# Patient Record
Sex: FEMALE | Race: WHITE
[De-identification: names, ages, dates, MRNs, and addresses within clinical notes are randomized per-mention and may not be internally consistent; named-entity substitution may affect disease eponyms.]

---

## 2019-09-26 ENCOUNTER — HOSPITAL ENCOUNTER (EMERGENCY)
Dept: HOSPITAL 97 - ER | Age: 44
Discharge: ANOTHER HOSPITAL | End: 2019-09-26
Payer: COMMERCIAL

## 2019-09-26 VITALS — TEMPERATURE: 97.8 F

## 2019-09-26 VITALS — DIASTOLIC BLOOD PRESSURE: 71 MMHG | SYSTOLIC BLOOD PRESSURE: 101 MMHG | OXYGEN SATURATION: 98 %

## 2019-09-26 DIAGNOSIS — Z72.0: ICD-10-CM

## 2019-09-26 DIAGNOSIS — I10: ICD-10-CM

## 2019-09-26 DIAGNOSIS — Z71.6: ICD-10-CM

## 2019-09-26 DIAGNOSIS — E78.5: ICD-10-CM

## 2019-09-26 DIAGNOSIS — I20.8: ICD-10-CM

## 2019-09-26 DIAGNOSIS — I21.4: Primary | ICD-10-CM

## 2019-09-26 DIAGNOSIS — F41.8: ICD-10-CM

## 2019-09-26 LAB
ALBUMIN SERPL BCP-MCNC: 4.3 G/DL (ref 3.4–5)
ALP SERPL-CCNC: 74 U/L (ref 45–117)
ALT SERPL W P-5'-P-CCNC: 45 U/L (ref 12–78)
AST SERPL W P-5'-P-CCNC: 28 U/L (ref 15–37)
BUN BLD-MCNC: 8 MG/DL (ref 7–18)
GLUCOSE SERPLBLD-MCNC: 85 MG/DL (ref 74–106)
HCT VFR BLD CALC: 42.3 % (ref 36–45)
INR BLD: 0.98
LIPASE SERPL-CCNC: 166 U/L (ref 73–393)
LYMPHOCYTES # SPEC AUTO: 4.5 K/UL (ref 0.7–4.9)
MAGNESIUM SERPL-MCNC: 1.9 MG/DL (ref 1.8–2.4)
METHAMPHET UR QL SCN: NEGATIVE
NT-PROBNP SERPL-MCNC: 318 PG/ML (ref ?–125)
PMV BLD: 8.3 FL (ref 7.6–11.3)
POTASSIUM SERPL-SCNC: 3.5 MMOL/L (ref 3.5–5.1)
RBC # BLD: 4.84 M/UL (ref 3.86–4.86)
THC SERPL-MCNC: NEGATIVE NG/ML
TROPONIN (EMERG DEPT USE ONLY): 0.23 NG/ML (ref 0–0.04)

## 2019-09-26 PROCEDURE — 83735 ASSAY OF MAGNESIUM: CPT

## 2019-09-26 PROCEDURE — 96374 THER/PROPH/DIAG INJ IV PUSH: CPT

## 2019-09-26 PROCEDURE — 80320 DRUG SCREEN QUANTALCOHOLS: CPT

## 2019-09-26 PROCEDURE — 80076 HEPATIC FUNCTION PANEL: CPT

## 2019-09-26 PROCEDURE — 84484 ASSAY OF TROPONIN QUANT: CPT

## 2019-09-26 PROCEDURE — 85610 PROTHROMBIN TIME: CPT

## 2019-09-26 PROCEDURE — 71045 X-RAY EXAM CHEST 1 VIEW: CPT

## 2019-09-26 PROCEDURE — 36415 COLL VENOUS BLD VENIPUNCTURE: CPT

## 2019-09-26 PROCEDURE — 85025 COMPLETE CBC W/AUTO DIFF WBC: CPT

## 2019-09-26 PROCEDURE — 96372 THER/PROPH/DIAG INJ SC/IM: CPT

## 2019-09-26 PROCEDURE — 80307 DRUG TEST PRSMV CHEM ANLYZR: CPT

## 2019-09-26 PROCEDURE — 81003 URINALYSIS AUTO W/O SCOPE: CPT

## 2019-09-26 PROCEDURE — 93005 ELECTROCARDIOGRAM TRACING: CPT

## 2019-09-26 PROCEDURE — 80048 BASIC METABOLIC PNL TOTAL CA: CPT

## 2019-09-26 PROCEDURE — 96375 TX/PRO/DX INJ NEW DRUG ADDON: CPT

## 2019-09-26 PROCEDURE — 83880 ASSAY OF NATRIURETIC PEPTIDE: CPT

## 2019-09-26 PROCEDURE — 99285 EMERGENCY DEPT VISIT HI MDM: CPT

## 2019-09-26 PROCEDURE — 83690 ASSAY OF LIPASE: CPT

## 2019-09-26 NOTE — EDPHYS
Physician Documentation                                                                           

 [facility]                                                                 

Name: Yolande Soliz                                                                               

Age: 43 yrs                                                                                       

Sex: Female                                                                                       

: 1975                                                                                   

MRN: [id number]                                                                                   

Arrival Date: 2019                                                                          

Time: 02:23                                                                                       

Account#: [id number]                                                                            

Bed 8                                                                                             

Private MD:                                                                                       

ED Physician Dario Lambert                                                                      

HPI:                                                                                              

                                                                                             

03:28 This 43 yrs old  Female presents to ER via Wheelchair with complaints of chest audra 

      pain.                                                                                       

03:28 The patient or guardian reports chest pain that is located primarily in the substernal  audra 

      area. Onset: just prior to arrival, this morning. The pain does not radiate. Associated     

      signs and symptoms: Pertinent positives: lightheadedness, nausea, shortness of breath.      

      The chest pain is described as clutching, crushing, a heaviness, a pressure. Modifying      

      factors: The symptoms are alleviated by nothing. the symptoms are aggravated by             

      nothing. Severity of pain: At its worst the pain was moderate in the emergency              

      department the pain has improved mildly.                                                    

                                                                                                  

OB/GYN:                                                                                           

04:41 LMP N/A - Hysterectomy                                                                  tl1 

                                                                                                  

Historical:                                                                                       

- Allergies:                                                                                      

02:32 No Known Allergies;                                                                     ak1 

- Home Meds:                                                                                      

02:32 buspirone 10 mg Oral tab 1 tab 2 times per day for Generalized Anxiety Disorder         ak1 

      [Active]; atorvastatin 10 mg oral tab 1 tab once daily for Hyperlipidemia [Active];         

      hydroxyzine HCl 25 mg Oral tab 1 tab 2 times daily for Anxiety [Active]; montelukast 10     

      mg oral tab 1 tab once daily [Active]; metoprolol tartrate 100 mg Oral tab 1 tab 2          

      times per day for Hypertension [Active]; losartan 100 mg oral tab 1 tab once daily          

      [Active];                                                                                   

- PMHx:                                                                                           

02:32 Hyperlipidemia; Hypertension; Anxiety;                                                  ak1 

- PSHx:                                                                                           

02:37 Tubal ligation; Hysterectomy;                                                           ak1 

                                                                                                  

- Immunization history:: Adult Immunizations unknown.                                             

- Social history:: Smoking status: Patient uses tobacco products, smokes one pack                 

  cigarettes per day.                                                                             

- Ebola Screening: : No symptoms or risks identified at this time.                                

- Family history:: not pertinent.                                                                 

                                                                                                  

                                                                                                  

ROS:                                                                                              

03:28 Constitutional: Negative for fever, chills, and weight loss, Eyes: Negative for injury, audra 

      pain, redness, and discharge, ENT: Negative for injury, pain, and discharge, Neck:          

      Negative for injury, pain, and swelling, Respiratory: Negative for shortness of breath,     

      cough, wheezing, and pleuritic chest pain, Abdomen/GI: Negative for abdominal pain,         

      nausea, vomiting, diarrhea, and constipation, Back: Negative for injury and pain, :       

      Negative for injury, bleeding, discharge, and swelling, MS/Extremity: Negative for          

      injury and deformity, Skin: Negative for injury, rash, and discoloration, Neuro:            

      Negative for headache, weakness, numbness, tingling, and seizure, Psych: Negative for       

      depression, anxiety, suicide ideation, homicidal ideation, and hallucinations,              

      Allergy/Immunology: Negative for hives, rash, and allergies, Endocrine: Negative for        

      neck swelling, polydipsia, polyuria, polyphagia, and marked weight changes,                 

      Hematologic/Lymphatic: Negative for swollen nodes, abnormal bleeding, and unusual           

      bruising.                                                                                   

03:28 Cardiovascular: Positive for chest pain, of the chest.                                      

                                                                                                  

Exam:                                                                                             

03:28 Constitutional:  This is a well developed, well nourished patient who is awake, alert,  audra 

      and in no acute distress. Head/Face:  Normocephalic, atraumatic. Eyes:  Pupils equal        

      round and reactive to light, extra-ocular motions intact.  Lids and lashes normal.          

      Conjunctiva and sclera are non-icteric and not injected.  Cornea within normal limits.      

      Periorbital areas with no swelling, redness, or edema. ENT:  Nares patent. No nasal         

      discharge, no septal abnormalities noted.  Tympanic membranes are normal and external       

      auditory canals are clear.  Oropharynx with no redness, swelling, or masses, exudates,      

      or evidence of obstruction, uvula midline.  Mucous membranes moist. Neck:  Trachea          

      midline, no thyromegaly or masses palpated, and no cervical lymphadenopathy.  Supple,       

      full range of motion without nuchal rigidity, or vertebral point tenderness.  No            

      Meningismus. Chest/axilla:  Normal chest wall appearance and motion.  Nontender with no     

      deformity.  No lesions are appreciated. Respiratory:  Lungs have equal breath sounds        

      bilaterally, clear to auscultation and percussion.  No rales, rhonchi or wheezes noted.     

       No increased work of breathing, no retractions or nasal flaring. Abdomen/GI:  Soft,        

      non-tender, with normal bowel sounds.  No distension or tympany.  No guarding or            

      rebound.  No evidence of tenderness throughout. Back:  No spinal tenderness.  No            

      costovertebral tenderness.  Full range of motion. Female :  Normal external               

      genitalia. Skin:  Warm, dry with normal turgor.  Normal color with no rashes, no            

      lesions, and no evidence of cellulitis. MS/ Extremity:  Pulses equal, no cyanosis.          

      Neurovascular intact.  Full, normal range of motion. Neuro:  Awake and alert, GCS 15,       

      oriented to person, place, time, and situation.  Cranial nerves II-XII grossly intact.      

      Motor strength 5/5 in all extremities.  Sensory grossly intact.  Cerebellar exam            

      normal.  Normal gait. Psych:  Awake, alert, with orientation to person, place and time.     

       Behavior, mood, and affect are within normal limits.                                       

03:28 Cardiovascular: Rate: normal, Rhythm: regular, Pulses: Pulses are 4+ in bilateral           

      radial, brachial, femoral, popliteal, posterior tibial and and dorsalis pedis               

      arteries.. Heart sounds: normal, Edema: is not appreciated, JVD: is not appreciated.        

                                                                                                  

Vital Signs:                                                                                      

02:27  / 97; Pulse 94; Resp 15; Temp 97.8(TE); Pulse Ox 100% on R/A; Weight 72.57 kg    ak1 

      (R); Height 5 ft. 7 in. (170.18 cm) (R); Pain 8/10;                                         

03:07  / 77; Pulse 89; Resp 16; Pulse Ox 95% on R/A;                                    tl1 

04:00  / 77; Pulse 82; Resp 16 S; Pulse Ox 100% on 2 lpm NC;                            bb  

04:41  / 88; Pulse 76; Resp 22; Pulse Ox 100% on R/A; Pain 0/10;                        tl1 

05:59  / 71; Pulse 68; Resp 23; Pulse Ox 98% on R/A;                                    tl1 

02:27 Body Mass Index 25.06 (72.57 kg, 170.18 cm)                                             ak1 

                                                                                                  

MDM:                                                                                              

02:41 Patient medically screened.                                                             [facility] 

06:12 Data reviewed: vital signs, nurses notes, lab test result(s), EKG, radiologic studies,  audra 

      plain films.                                                                                

                                                                                                  

                                                                                             

02:32 Order name: Basic Metabolic Panel; Complete Time: 06:11                                   

                                                                                             

02:32 Order name: CBC with Diff; Complete Time: 03:16                                           

                                                                                             

02:32 Order name: LFT's; Complete Time: 06:11                                                   

                                                                                             

02:32 Order name: Magnesium; Complete Time: 06:11                                               

                                                                                             

02:32 Order name: NT PRO-BNP; Complete Time: 06:11                                              

                                                                                             

02:32 Order name: PT-INR; Complete Time: 03:16                                                  

                                                                                             

02:32 Order name: Troponin (emerg Dept Use Only); Complete Time: 06:11                          

                                                                                             

02:32 Order name: XRAY Chest (1 view)                                                           

                                                                                             

03:27 Order name: UDS; Complete Time: 06:11                                                   [facility] 

                                                                                             

03:27 Order name: ETOH Level; Complete Time: 06:11                                            [facility] 

                                                                                             

03:36 Order name: Lipase; Complete Time: 06:11                                                [facility]

                                                                                             

03:48 Order name: Urine Dipstick--Ancillary (enter results)                                   [facility] 

                                                                                             

02:32 Order name: EKG; Complete Time: 02:35                                                     

                                                                                             

02:32 Order name: Cardiac monitoring; Complete Time: 02:44                                      

                                                                                             

02:32 Order name: EKG - Nurse/Tech; Complete Time: 02:44                                        

                                                                                             

02:32 Order name: IV Saline Lock; Complete Time: 02:44                                          

                                                                                             

02:32 Order name: Labs collected and sent; Complete Time: 02:44                                 

                                                                                             

02:32 Order name: O2 Per Protocol; Complete Time: 02:44                                         

                                                                                             

03:17 Order name: EKG; Complete Time: 03:19                                                   [facility] 

                                                                                             

06:12 Order name: EKG; Complete Time: 06:13                                                   [facility] 

                                                                                             

02:32 Order name: O2 Sat Monitoring; Complete Time: 04:43                                       

                                                                                             

03:17 Order name: EKG - Nurse/Tech; Complete Time: 03:24                                      [facility] 

                                                                                             

06:12 Order name: EKG - Nurse/Tech; Complete Time: 06:12                                      [facility] 

                                                                                                  

Administered Medications:                                                                         

02:47 Drug: Aspirin Chewable Tablet 162 mg Route: PO;                                         tl1 

04:35 Follow up: Response: No adverse reaction                                                bb  

03:32 Drug: morphine 2 mg Route: IVP; Infused Over: 2 mins; Site: right antecubital;          tl1 

04:35 Follow up: Response: No adverse reaction; RASS: Alert and Calm (0)                        

03:32 Drug: Zofran 4 mg Route: IVP; Infused Over: 2 mins; Site: right antecubital;            tl1 

04:35 Follow up: Response: No adverse reaction                                                bb  

03:33 Drug: Lopressor (metoprolol TARTRATE) 50 mg Route: PO;                                  tl1 

04:36 Follow up: Response: No adverse reaction                                                bb  

03:33 Drug: PlaVIX 600 mg Route: PO;                                                          tl1 

04:34 Follow up: Response: No adverse reaction                                                bb  

03:33 Drug: Pepcid 20 mg Route: IVP; Infused Over: 2 mins; Site: right antecubital;           tl1 

04:34 Follow up: Response: No adverse reaction                                                bb  

03:34 Drug: Lovenox 80 mg Route: Sub-Q; Site: abdomen;                                        tl1 

04:36 Follow up: Response: No adverse reaction                                                bb  

05:58 Drug: morphine 2 mg Route: IVP; Infused Over: 2 mins; Site: right antecubital;          tl1 

06:24 Follow up: Response: No adverse reaction; Marked relief of symptoms; Pain is decreased; tl1 

      RASS: Drowsy (-1)                                                                           

05:59 Drug: Zofran 4 mg Route: IVP; Infused Over: 2 mins; Site: right antecubital;            tl1 

06:20 Follow up: Response: No adverse reaction; Marked relief of symptoms; Nausea is decreasedtl1 

06:13 Drug: fentaNYL (PF) 50 mcg Route: IVP; Infused Over: 2 mins; Site: right antecubital;   tl1 

06:20 Follow up: Response: No adverse reaction; Marked relief of symptoms; Pain is decreased; tl1 

      RASS: Drowsy (-1)                                                                           

                                                                                                  

                                                                                                  

Disposition:                                                                                      

19 03:34 Transfer ordered to [facility]. Diagnosis are Chest pain,      

  unspecified, Angina pectoris, Non-ST elevation (NSTEMI) myocardial                              

  infarction, Tobacco abuse counseling, Tobacco use, Alcohol abuse.                               

- Reason for transfer: Higher level of care.                                                      

- Accepting physician is to sht, cardiology.                                                      

- Condition is Fair.                                                                              

- Problem is new.                                                                                 

- Symptoms have improved.                                                                         

                                                                                                  

                                                                                                  

                                                                                                  

Signatures:                                                                                       

Dispatcher [employer]                           EDMS                                                 

Dario Lambert MD MD cha Ballard, Brenda, RN                     RN   bb                                                   

Ofelia Cook, RN                      RN   tl1                                                  

Cady Torres RN                       RN   ak1                                                  

                                                                                                  

Corrections: (The following items were deleted from the chart)                                    

03:36 03:28 LIPASE+C.LAB.BRZ ordered. EDMS                                                    EDMS

06:13 03:34 2019 03:34 Transfer ordered to [facility]. Diagnosis is audra 

      Chest pain, unspecified; Angina pectoris; Non-ST elevation (NSTEMI) myocardial              

      infarction; Tobacco abuse counseling; Tobacco use. Reason for transfer: Higher level of     

      care. Accepting physician is to t, cardiology. Condition is Fair. Problem is new.         

      Symptoms have improved. [facility]                                                                 

06:42 06:13 2019 03:34 Transfer ordered to [facility]. Diagnosis is tl1 

      Chest pain, unspecified; Angina pectoris; Non-ST elevation (NSTEMI) myocardial              

      infarction; Tobacco abuse counseling; Tobacco use; Alcohol abuse. Reason for transfer:      

      Higher level of care. Accepting physician is to [facility], cardiology. Condition is Fair.         

      Problem is new. Symptoms have improved. audra                                                 

                                                                                                  

**************************************************************************************************

## 2019-09-26 NOTE — RAD REPORT
EXAM DESCRIPTION:  RAD - Chest Single View - 9/26/2019 3:07 am

 

CLINICAL HISTORY:  Chest pain

 

COMPARISON:  None.

 

TECHNIQUE:  AP portable chest image was obtained 0305 hours .

 

FINDINGS:  Lungs are clear. Heart and vasculature are normal. No measurable pleural effusion and no p
neumothorax. No acute bony abnormality seen. No acute aortic findings suspected.

 

IMPRESSION:  No acute cardiopulmonary process.

## 2019-09-26 NOTE — ER
Nurse's Notes                                                                                     

 [facility]                                                                 

Name: Yolande Soliz                                                                               

Age: 43 yrs                                                                                       

Sex: Female                                                                                       

: 1975                                                                                   

MRN: [id number]                                                                                   

Arrival Date: 2019                                                                          

Time: 02:23                                                                                       

Account#: [id number]                                                                            

Bed 8                                                                                             

Private MD:                                                                                       

Diagnosis: Chest pain, unspecified;Angina pectoris;Non-ST elevation (NSTEMI) myocardial           

  infarction;Tobacco abuse counseling;Tobacco use;Alcohol abuse                                   

                                                                                                  

Presentation:                                                                                     

                                                                                             

02:28 Presenting complaint: Patient states: chest pain to center of her chest. pt with        ak1 

      cardiology appointment 10/7/19 in [place]. pt stated she has had intermittent chest     

      pain X3 months PTA. pt stated she drank earlier tonight. Transition of care: patient        

      was not received from another setting of care. Onset of symptoms is unknown. Risk           

      Assessment: Do you want to hurt yourself or someone else? Patient reports no desire to      

      harm self or others. Initial Sepsis Screen: Does the patient meet any 2 criteria? No.       

      Patient's initial sepsis screen is negative. Does the patient have a suspected source       

      of infection? No. Patient's initial sepsis screen is negative. Care prior to arrival:       

      None.                                                                                       

02:28 Method Of Arrival: Wheelchair                                                           ak1 

02:28 Acuity: SHEEBA 3                                                                           ak1 

                                                                                                  

Triage Assessment:                                                                                

02:32 General: Appears in no apparent distress. uncomfortable, Behavior is cooperative,       ak1 

      anxious.                                                                                    

                                                                                                  

OB/GYN:                                                                                           

04:41 LMP N/A - Hysterectomy                                                                  tl1 

                                                                                                  

Historical:                                                                                       

- Allergies:                                                                                      

02:32 No Known Allergies;                                                                     ak1 

- Home Meds:                                                                                      

02:32 buspirone 10 mg Oral tab 1 tab 2 times per day for Generalized Anxiety Disorder         ak1 

      [Active]; atorvastatin 10 mg oral tab 1 tab once daily for Hyperlipidemia [Active];         

      hydroxyzine HCl 25 mg Oral tab 1 tab 2 times daily for Anxiety [Active]; montelukast 10     

      mg oral tab 1 tab once daily [Active]; metoprolol tartrate 100 mg Oral tab 1 tab 2          

      times per day for Hypertension [Active]; losartan 100 mg oral tab 1 tab once daily          

      [Active];                                                                                   

- PMHx:                                                                                           

02:32 Hyperlipidemia; Hypertension; Anxiety;                                                  ak1 

- PSHx:                                                                                           

02:37 Tubal ligation; Hysterectomy;                                                           ak1 

                                                                                                  

- Immunization history:: Adult Immunizations unknown.                                             

- Social history:: Smoking status: Patient uses tobacco products, smokes one pack                 

  cigarettes per day.                                                                             

- Ebola Screening: : No symptoms or risks identified at this time.                                

- Family history:: not pertinent.                                                                 

                                                                                                  

                                                                                                  

Screenin:33 Abuse screen: Denies threats or abuse. Denies injuries from another. Nutritional        ak1 

      screening: No deficits noted. Tuberculosis screening: No symptoms or risk factors           

      identified. Fall Risk None identified.                                                      

                                                                                                  

Assessment:                                                                                       

02:59 General: Appears uncomfortable, Behavior is calm, cooperative, appropriate for age.     tl1 

      Pain: Complains of pain in mid-sternal area. Neuro: Level of Consciousness is awake,        

      alert, obeys commands, Oriented to person, place, time, situation. Cardiovascular:          

      Reports chest pain, Capillary refill < 3 seconds Patient's skin is warm and dry. Rhythm     

      is sinus rhythm Chest pain quality is heaviness, pressure, is located in substernal         

      area. Respiratory: Airway is patent Trachea midline Respiratory effort is even,             

      unlabored, Breath sounds are clear bilaterally. GI: Abdomen is non-distended, Bowel         

      sounds present X 4 quads. Abd is soft and non tender X 4 quads. : No signs and/or         

      symptoms were reported regarding the genitourinary system. EENT: No signs and/or            

      symptoms were reported regarding the EENT system. Derm: No signs and/or symptoms            

      reported regarding the dermatologic system. Musculoskeletal: No signs and/or symptoms       

      reported regarding the musculoskeletal system.                                              

04:00 Reassessment: Patient and/or family updated on plan of care and expected duration. Pain bb  

      level reassessed. Patient is alert, oriented x 3, equal unlabored respirations, skin        

      warm/dry/pink. IV site intact with no erythema or edema noted. Reassessment: report         

      given to Edgardo George RN for [facility].                                          

                                                                                                  

Vital Signs:                                                                                      

02:27  / 97; Pulse 94; Resp 15; Temp 97.8(TE); Pulse Ox 100% on R/A; Weight 72.57 kg    ak1 

      (R); Height 5 ft. 7 in. (170.18 cm) (R); Pain 8/10;                                         

03:07  / 77; Pulse 89; Resp 16; Pulse Ox 95% on R/A;                                    tl1 

04:00  / 77; Pulse 82; Resp 16 S; Pulse Ox 100% on 2 lpm NC;                            bb  

04:41  / 88; Pulse 76; Resp 22; Pulse Ox 100% on R/A; Pain 0/10;                        tl1 

05:59  / 71; Pulse 68; Resp 23; Pulse Ox 98% on R/A;                                    tl1 

02:27 Body Mass Index 25.06 (72.57 kg, 170.18 cm)                                             ak1 

                                                                                                  

ED Course:                                                                                        

02:23 Patient arrived in ED.                                                                  em1 

02:27 Arm band placed on Patient placed in an exam room, on a stretcher, on cardiac monitor,  ak1 

      on pulse oximetry, Patient notified of wait time.                                           

02:30 Triage completed.                                                                       ak1 

02:33 Patient has correct armband on for positive identification. Placed in gown. Bed in low  ak1 

      position. Call light in reach. Side rails up X 1. Cardiac monitor on. Pulse ox on. NIBP     

      on.                                                                                         

02:41 Dario Lambert MD is Attending Physician.                                             [facility] 

02:43 Ofelia Cook RN is Primary Nurse.                                                    tl1 

02:45 Inserted saline lock: 20 gauge in right antecubital area, using aseptic technique.      tl1 

      Blood collected.                                                                            

03:02 No provider procedures requiring assistance completed.                                  tl1 

03:08 XRAY Chest (1 view) In Process Unspecified.                                             EDMS

03:59 Administrative approval for transfer given by Reba Jamison RN, [facility]        [facility] 

      .                                                                       

04:01 Patient transferred, IV remains in place.                                               bb  

04:58 Due to the unavailability of transport resources, transport time is delayed. [address] [facility] has no available ambulances, [facility] has not been able to be contacted,       

      and other outside resources are also unavailable.                                           

                                                                                                  

Administered Medications:                                                                         

02:47 Drug: Aspirin Chewable Tablet 162 mg Route: PO;                                         tl1 

04:35 Follow up: Response: No adverse reaction                                                bb  

03:32 Drug: morphine 2 mg Route: IVP; Infused Over: 2 mins; Site: right antecubital;          tl1 

04:35 Follow up: Response: No adverse reaction; RASS: Alert and Calm (0)                      bb  

03:32 Drug: Zofran 4 mg Route: IVP; Infused Over: 2 mins; Site: right antecubital;            tl1 

04:35 Follow up: Response: No adverse reaction                                                bb  

03:33 Drug: Lopressor (metoprolol TARTRATE) 50 mg Route: PO;                                  tl1 

04:36 Follow up: Response: No adverse reaction                                                bb  

03:33 Drug: PlaVIX 600 mg Route: PO;                                                          tl1 

04:34 Follow up: Response: No adverse reaction                                                bb  

03:33 Drug: Pepcid 20 mg Route: IVP; Infused Over: 2 mins; Site: right antecubital;           tl1 

04:34 Follow up: Response: No adverse reaction                                                bb  

03:34 Drug: Lovenox 80 mg Route: Sub-Q; Site: abdomen;                                        tl1 

04:36 Follow up: Response: No adverse reaction                                                bb  

05:58 Drug: morphine 2 mg Route: IVP; Infused Over: 2 mins; Site: right antecubital;          tl1 

06:24 Follow up: Response: No adverse reaction; Marked relief of symptoms; Pain is decreased; tl1 

      RASS: Drowsy (-1)                                                                           

05:59 Drug: Zofran 4 mg Route: IVP; Infused Over: 2 mins; Site: right antecubital;            tl1 

06:20 Follow up: Response: No adverse reaction; Marked relief of symptoms; Nausea is decreasedtl1 

06:13 Drug: fentaNYL (PF) 50 mcg Route: IVP; Infused Over: 2 mins; Site: right antecubital;   tl1 

06:20 Follow up: Response: No adverse reaction; Marked relief of symptoms; Pain is decreased; tl1 

      RASS: Drowsy (-1)                                                                           

                                                                                                  

                                                                                                  

Outcome:                                                                                          

03:34 ER care complete, transfer ordered by MD. zhu 

04:01 Instructed on the need for transfer.                                                    bb  

04:21 Condition: stable                                                                       bb  

06:19 Transferred by ground EMS to [facility], Transfer form completed.    tl1 

      X-rays sent w/ patient.                                                                     

06:19 Condition: stable                                                                           

06:19 Instructed on the need for transfer.                                                        

06:42 Patient left the ED.                                                                    tl1 

                                                                                                  

Signatures:                                                                                       

Dispatcher [employer]                           EDMS                                                 

Dario Lambert MD MD cha Ballard, Brenda, RN RN bb Martinez, Eric                               em1                                                  

Ofelia Cook RN RN   tl1                                                  

Cady Torres RN                       RN   ak1                                                  

                                                                                                  

Corrections: (The following items were deleted from the chart)                                    

02:37 02:27  / 97; Pulse 94bpm; Resp 15bpm; Pulse Ox 100% RA; 72.57 kg Reported; Height ak1 

      5 ft. 7 in. Reported; BMI: 25.0; Pain 8/10; ak1                                             

05:04 03:52 Administrative approval for transfer given by Mai Camargo RN, [facility]     [facility] 

      . em1                                                                   

05:06 03:59 Administrative approval for transfer given by Mai Camargo RN, [place] [address] 

      . em1                                                                   

                                                                                                  

**************************************************************************************************

## 2019-09-27 NOTE — EKG
Test Date:    2019-09-26               Test Time:    03:20:49

Technician:   TL                                     

                                                     

MEASUREMENT RESULTS:                                       

Intervals:                                           

Rate:         89                                     

DE:           142                                    

QRSD:         80                                     

QT:           394                                    

QTc:          479                                    

Axis:                                                

P:            60                                     

DE:           142                                    

QRS:          37                                     

T:            27                                     

                                                     

INTERPRETIVE STATEMENTS:                                       

                                                     

Normal sinus rhythm

Possible Left atrial enlargement

Borderline ECG

Compared to ECG 09/26/2019 02:30:37

Prolonged QT interval no longer present



Electronically Signed On 09-27-19 08:40:10 CDT by Vinod Trinidad

## 2019-09-27 NOTE — EKG
Test Date:    2019-09-26               Test Time:    06:15:45

Technician:   TL                                     

                                                     

MEASUREMENT RESULTS:                                       

Intervals:                                           

Rate:         68                                     

OK:           146                                    

QRSD:         80                                     

QT:           442                                    

QTc:          469                                    

Axis:                                                

P:            65                                     

OK:           146                                    

QRS:          61                                     

T:            70                                     

                                                     

INTERPRETIVE STATEMENTS:                                       

                                                     

Normal sinus rhythm

Right atrial enlargement

Borderline ECG

Compared to ECG 09/26/2019 03:20:49

No significant changes



Electronically Signed On 09-27-19 08:39:59 CDT by Vinod Trinidad

## 2019-09-27 NOTE — EKG
Test Date:    2019-09-26               Test Time:    02:30:37

Technician:   TL                                     

                                                     

MEASUREMENT RESULTS:                                       

Intervals:                                           

Rate:         95                                     

CT:           144                                    

QRSD:         72                                     

QT:           382                                    

QTc:          480                                    

Axis:                                                

P:            63                                     

CT:           144                                    

QRS:          39                                     

T:            29                                     

                                                     

INTERPRETIVE STATEMENTS:                                       

                                                     

Normal sinus rhythm

Possible Left atrial enlargement

Prolonged QT

Abnormal ECG

Compared to ECG 08/19/2000 08:19:00

Prolonged QT interval now present



Electronically Signed On 09-27-19 08:40:13 CDT by Vinod Trinidad

## 2022-10-26 LAB
BLD SMEAR INTERP: (no result)
BUN BLD-MCNC: 11 MG/DL (ref 7–18)
GLUCOSE SERPLBLD-MCNC: 95 MG/DL (ref 74–106)
HCT VFR BLD CALC: 45.1 % (ref 36–45)
INR BLD: 1.02
LYMPHOCYTES # SPEC AUTO: 3.4 K/UL (ref 0.7–4.9)
MCV RBC: 87.6 FL (ref 80–100)
MORPHOLOGY BLD-IMP: (no result)
PMV BLD: 8.5 FL (ref 7.6–11.3)
POTASSIUM SERPL-SCNC: 3 MMOL/L (ref 3.5–5.1)
RBC # BLD: 5.15 M/UL (ref 3.86–4.86)

## 2022-10-26 NOTE — RAD REPORT
EXAM DESCRIPTION:  RAD - Chest Pa And Lat (2 Views) - 10/26/2022 2:49 pm

 

CLINICAL HISTORY:  Pre op pending heart cath

 

COMPARISON:  Portable 09/26/2019

 

TECHNIQUE:  Frontal and lateral views of the chest were obtained.

 

FINDINGS:  The lungs are clear.  Interstitial pattern matches comparison. Heart size is normal and ce
ntral vasculature is within normal limits.  No pleural effusion or pneumothorax seen.  No acute bony 
finding noted.  No aortic abnormality.

 

 No significant change from comparison study.

 

IMPRESSION:  No acute cardiopulmonary process.

## 2022-10-27 ENCOUNTER — HOSPITAL ENCOUNTER (OUTPATIENT)
Dept: HOSPITAL 97 - CCL | Age: 47
Discharge: HOME | End: 2022-10-27
Attending: INTERNAL MEDICINE
Payer: COMMERCIAL

## 2022-10-27 VITALS — DIASTOLIC BLOOD PRESSURE: 62 MMHG | OXYGEN SATURATION: 96 % | SYSTOLIC BLOOD PRESSURE: 119 MMHG

## 2022-10-27 DIAGNOSIS — Z79.899: ICD-10-CM

## 2022-10-27 DIAGNOSIS — Z88.5: ICD-10-CM

## 2022-10-27 DIAGNOSIS — Z79.82: ICD-10-CM

## 2022-10-27 DIAGNOSIS — I10: ICD-10-CM

## 2022-10-27 DIAGNOSIS — E78.5: ICD-10-CM

## 2022-10-27 DIAGNOSIS — Z82.49: ICD-10-CM

## 2022-10-27 DIAGNOSIS — Z95.5: ICD-10-CM

## 2022-10-27 DIAGNOSIS — I65.29: ICD-10-CM

## 2022-10-27 DIAGNOSIS — I25.110: Primary | ICD-10-CM

## 2022-10-27 DIAGNOSIS — I73.9: ICD-10-CM

## 2022-10-27 PROCEDURE — 80048 BASIC METABOLIC PNL TOTAL CA: CPT

## 2022-10-27 PROCEDURE — 71046 X-RAY EXAM CHEST 2 VIEWS: CPT

## 2022-10-27 PROCEDURE — 93005 ELECTROCARDIOGRAM TRACING: CPT

## 2022-10-27 PROCEDURE — 76937 US GUIDE VASCULAR ACCESS: CPT

## 2022-10-27 PROCEDURE — 36415 COLL VENOUS BLD VENIPUNCTURE: CPT

## 2022-10-27 PROCEDURE — 93458 L HRT ARTERY/VENTRICLE ANGIO: CPT

## 2022-10-27 PROCEDURE — 85730 THROMBOPLASTIN TIME PARTIAL: CPT

## 2022-10-27 PROCEDURE — 85610 PROTHROMBIN TIME: CPT

## 2022-10-27 PROCEDURE — 85025 COMPLETE CBC W/AUTO DIFF WBC: CPT

## 2022-10-27 NOTE — EKG
Test Date:    2022-10-26               Test Time:    14:18:55

Technician:   BOOM                                     

                                                     

MEASUREMENT RESULTS:                                       

Intervals:                                           

Rate:         74                                     

MS:           136                                    

QRSD:         80                                     

QT:           392                                    

QTc:          435                                    

Axis:                                                

P:            77                                     

MS:           136                                    

QRS:          83                                     

T:            64                                     

                                                     

INTERPRETIVE STATEMENTS:                                       

                                                     

Normal sinus rhythm

Right atrial enlargement

Borderline ECG

Compared to ECG 09/26/2019 06:15:45

No significant changes



Electronically Signed On 10-27-22 14:30:02 CDT by Masood Price

## 2022-10-27 NOTE — OP
Date of Procedure:  10/27/2022



Surgeon:  ROMEL SANTILLAN



Procedure Performed:  

1.Selective coronary angiogram.

2.Left heart catheterization.



Indication:  Unstable angina.



Access:  Right femoral artery 6[place] closed with 6[place] Angio-Seal.



Complications:  None.



Estimated Blood Loss:  Bleeding less than 10 mL.



Description Of Procedure:  After risks, benefits, and alternatives were explained, patient agreed to 
procedure and signed informed consent.  We gave fentanyl and Versed in incremental doses to achieve a
dequate amount of sedation.  Then, I accessed right femoral artery.  Using micropuncture kit and ultr
asound guidance and fluoroscopy, I placed 6-[place] Timber sheath, took [place] JR4 into the aortic
 root over a J-wire, engaged the left main and then the catheter was pushed over the wire into the LV
, measured the LVEDP and pullback.  Did not record any gradient and exchanged for [place] JL4 cathet
er, engaged the left main, took standard views and the catheter was removed.  Sheath was removed and 
then I placed 6[place] Angio-Seal for closure with good hemostasis.



Findings:  

1.Left main is large and normal.

2.LAD; large vessel with proximal to mid stent, which has about 30% stenosis in the mid segment, oth
erwise patent stent and LAD appears normal, otherwise.  Diagonal branches are normal.

3.Left circumflex.  There is a high OM takeoff that is with proximal 30%.  The second OM is normal. 
 The rest of the left circumflex is normal.

4.RCA.  It is moderate size and codominant with the left circumflex.  Has a proximal stent that is p
atent and then in the midportion, there is about 40% stenosis.

5.LVEDP is normal around 10 mmHg.



Conclusion:  

1.Mild nonobstructive coronary artery disease.

2.Patent LAD and RCA stent.

3.Borderline LVEDP.



Recommendation:  Medical management and risk factor modification.





SR/MODL

DD:  10/27/2022 13:53:25Voice ID:  [id number]

DT:  10/27/2022 15:52:15Report ID:  [id number]